# Patient Record
(demographics unavailable — no encounter records)

---

## 2024-10-18 NOTE — PROCEDURE
[FreeTextEntry1] : LLE varithena injection  [FreeTextEntry2] : LLE varicose veins with pain  [FreeTextEntry3] : Explained varithena procedure to patient and obtained verbal consent. All questions answered prior to procedure. 70% alcohol used to prep left leg. One lateral knee/thigh accessory varicose vein access with 22 gauge IV under ultrasound guidance. 15 cc of varithena injected into varicose veins. Varithena was tracked with ultrasound and did not enter the deep system.  Sterile gauze and ACE wraps applied to left leg. Pt tolerated procedure well.

## 2024-10-25 NOTE — PROCEDURE
[FreeTextEntry1] : BLE sclerotherapy  [FreeTextEntry2] : Spider and reticular veins  [FreeTextEntry3] : Explained sclerotherapy procedure to patient and obtained verbal consent. All questions answered prior to procedure. 70% alcohol used to prep legs prior to injection. 0.5% polidocanol injected into BLE reticular veins through a 30 gauge needle. Over 20 injections into legs bilaterally. Sterile gauze and ACE wraps applied to legs bilaterally. Pt tolerated procedure well.

## 2024-11-07 NOTE — PHYSICAL EXAM
[2+] : left 2+ [Ankle Swelling Bilaterally] : bilaterally  [Ankle Swelling (On Exam)] : present [Ankle Swelling On The Right] : mild [Varicose Veins Of Lower Extremities] : present [Varicose Veins Of The Left Leg] : of the left leg [Ankle Swelling On The Left] : moderate [de-identified] : NAD. well appearing

## 2024-11-07 NOTE — ASSESSMENT
[FreeTextEntry1] : 60 yo female s/p LLE varithena for painful LLE varicose veins.  No DVT on post op duplex  Pt counseled on results of duplex and above diagnosis. Pt counseled to continue to use compression stockings  Pt counseled to walk daily and elevate legs at night Use warm compresses on thrombosed veins RTC in 3 months to monitor symptoms   A total of 30 minutes was spent with patient and coordinating care

## 2024-11-07 NOTE — PROCEDURE
[FreeTextEntry1] : 9/19/24 LLE venous duplex: lateral thigh varicose veins with reflux. No DVT  10/25/24 LLE venous duplex: no DVT. thrombosed lateral thigh varicose veins

## 2024-11-07 NOTE — HISTORY OF PRESENT ILLNESS
[FreeTextEntry1] : 9/19/24: 62 yo female PMHx GERD, anxiety, presents for evaluation of LLE varicose veins. Pt has had varicose veins of many years, recently becoming more painful. She also has swelling of BLE which is worse towards the end of the day after standing. Pt has been using 20-30 mmHg compression stockings for over 3 months without significant improvement in pain or swelling. Pt denies hx of DVT. Denies recent fever or chills.  11/7/24: Pt is s/p LLE varithena injection and sclerotherapy. Pt has some tenderness over left lateral thigh veins.   PSHx:  10/25/24 BLE sclerotherapy  10/18/24 LLE varithena injection

## 2024-12-20 NOTE — ASSESSMENT
[FreeTextEntry1] : 62 yo female s/p LLE varithena for painful LLE varicose veins.  No DVT on post op duplex  Pt counseled on results of prior duplex and above diagnosis. Pt counseled to continue to use compression stockings  Pt counseled to use warm compress and gentle massage on thrombosed veins  Use warm compresses on thrombosed veins RTC PRN   A total of 30 minutes was spent with patient and coordinating care

## 2024-12-20 NOTE — HISTORY OF PRESENT ILLNESS
[FreeTextEntry1] : 9/19/24: 60 yo female PMHx GERD, anxiety, presents for evaluation of LLE varicose veins. Pt has had varicose veins of many years, recently becoming more painful. She also has swelling of BLE which is worse towards the end of the day after standing. Pt has been using 20-30 mmHg compression stockings for over 3 months without significant improvement in pain or swelling. Pt denies hx of DVT. Denies recent fever or chills.  11/7/24: Pt is s/p LLE varithena injection and sclerotherapy. Pt has some tenderness over left lateral thigh veins.   12/20/24: Pt is s/p LLE varithena injection and sclerotherapy. Pt has some tenderness over left lateral thigh veins. She has some veins in left posterior knee area that are large.   PSHx:  10/25/24 BLE sclerotherapy  10/18/24 LLE varithena injection

## 2024-12-20 NOTE — PHYSICAL EXAM
[2+] : left 2+ [Ankle Swelling (On Exam)] : present [Ankle Swelling Bilaterally] : bilaterally  [Ankle Swelling On The Right] : mild [Varicose Veins Of Lower Extremities] : present [Varicose Veins Of The Left Leg] : of the left leg [Ankle Swelling On The Left] : moderate [de-identified] : NAD. well appearing

## 2025-04-28 NOTE — ASSESSMENT
[FreeTextEntry1] : Reviewed Today:  Echo 9/19/2022: EF 60%.  Mild MR.  Mildly dilated left atrium. Normal LV systolic function and no seg. wall motion abnormalities.  Mild TR.  No pericardial effusion.  Borderline pulmonary hypertension. 32mmHg  Zio: 8/23/2022: Sinus rhythm average 76 bpm.  Rare ectopy.  Positive symptoms x1 with sinus rhythm and sequential PACs.  EKG 8/23/2022 sinus rhythm 81 bpm poor R wave progression, LAD  September 2021: Magnesium 2.2 vitamin D 46 TSH 2.02 A1c 5.2% B12 374 total cholesterol 207  hemoglobin 12.6 hematocrit 35.9 potassium 4.5 creatinine 0.59 AST 18 ALT 12  ETT: 9/30/2022 Exercise 8:58  No evidence of ischemia by EKG.   Reviewed previously: - EKG 04/12/18: Sinus rhythm. IRBBB. No ST abnormality. - Labs and EKG from [facility] on 04/10/18 is pending - Echo May 2018: Normal LV function. PASP 35. - ETT in May 2018: No ischemia. no significant arrhythmias - Holter August 2020: No PVCs.  Palpitations corresponding to sinus beats. - Labs July 2020: .

## 2025-04-28 NOTE — HISTORY OF PRESENT ILLNESS
[FreeTextEntry1] : Naomie is a pleasant 61-year-old female with history of GERD, palpitations, overweight and mild hypercholesteremia.   History of intermittent palpitations -described as fluttering sensation in the past.  Almost completely resolved.  Palpitations during her Holter recording in August 2020 corresponded to sinus rhythm.  She had no PVCs on that Holter.  On echocardiogram in 2018, she had normal LV function on echocardiogram. ETT was unremarkable at adequate level of exercise August 2020. Event recorder and ETT from 2022 reported below.  She denies PND, orthopnea, pedal edema or chest discomfort. She exercises regularly without cardiac symptoms.

## 2025-04-28 NOTE — DISCUSSION/SUMMARY
[FreeTextEntry1] : BRIAN PANTOJA is a 61 year old F who presents today with the above history and the following active issues:   Palpitations: rare ectopy.  Almost resolved.  No dizziness.  JOHANSEN: She exercises regularly and this has helped.  Last more than 15 pounds.    Her ASCVD <3%  baced on previous fasting lipids.. She follows fasting lipid profile with Dr. Maldonado.  Last LDL was 125 in January 2025.  Dietary changes were discussed.  Echo and ETT should be repeated in future  Thank you for this referral and allowing me to participate in the care of this patient.  If I can be of any further help or  if you have any questions, please do not hesitate to contact me  Sincerely, Geovanni Grimaldo MD, FACC, RIVKA